# Patient Record
Sex: FEMALE | Race: BLACK OR AFRICAN AMERICAN
[De-identification: names, ages, dates, MRNs, and addresses within clinical notes are randomized per-mention and may not be internally consistent; named-entity substitution may affect disease eponyms.]

---

## 2017-06-27 ENCOUNTER — HOSPITAL ENCOUNTER (EMERGENCY)
Dept: HOSPITAL 84 - D.ER | Age: 22
Discharge: HOME | End: 2017-06-27
Payer: MEDICAID

## 2017-06-27 DIAGNOSIS — E87.6: ICD-10-CM

## 2017-06-27 DIAGNOSIS — A59.9: ICD-10-CM

## 2017-06-27 DIAGNOSIS — R10.11: Primary | ICD-10-CM

## 2017-06-27 LAB
ALBUMIN SERPL-MCNC: 3.6 G/DL (ref 3.4–5)
ALP SERPL-CCNC: 75 U/L (ref 46–116)
ALT SERPL-CCNC: 16 U/L (ref 10–68)
ANION GAP SERPL CALC-SCNC: 12.9 MMOL/L (ref 8–16)
APPEARANCE UR: (no result)
BACTERIA #/AREA URNS HPF: (no result) /HPF
BASOPHILS NFR BLD AUTO: 0.1 % (ref 0–2)
BILIRUB SERPL-MCNC: 0.54 MG/DL (ref 0.2–1.3)
BILIRUB SERPL-MCNC: NEGATIVE MG/DL
BUN SERPL-MCNC: 18 MG/DL (ref 7–18)
CALCIUM SERPL-MCNC: 9 MG/DL (ref 8.5–10.1)
CHLORIDE SERPL-SCNC: 101 MMOL/L (ref 98–107)
CO2 SERPL-SCNC: 24.3 MMOL/L (ref 21–32)
COLOR UR: (no result)
CREAT SERPL-MCNC: 0.9 MG/DL (ref 0.6–1.3)
EOSINOPHIL NFR BLD: 0.5 % (ref 0–7)
ERYTHROCYTE [DISTWIDTH] IN BLOOD BY AUTOMATED COUNT: 13 % (ref 11.5–14.5)
GLOBULIN SER-MCNC: 4.5 G/L
GLUCOSE SERPL-MCNC: 91 MG/DL (ref 74–106)
GLUCOSE SERPL-MCNC: NEGATIVE MG/DL
HCG SERPL-ACNC: NEGATIVE M[IU]/ML
HCT VFR BLD CALC: 39.1 % (ref 36–48)
HGB BLD-MCNC: 13 G/DL (ref 12–16)
IMM GRANULOCYTES NFR BLD: 0.1 % (ref 0–5)
KETONES UR STRIP-MCNC: (no result) MG/DL
LEUKOCYTE ESTERASE: (no result)
LYMPHOCYTES NFR BLD AUTO: 28.1 % (ref 15–50)
MCH RBC QN AUTO: 30.1 PG (ref 26–34)
MCHC RBC AUTO-ENTMCNC: 33.2 G/DL (ref 31–37)
MCV RBC: 90.5 FL (ref 80–100)
MONOCYTES NFR BLD: 13.9 % (ref 2–11)
NEUTROPHILS NFR BLD AUTO: 57.3 % (ref 40–80)
NITRITE UR-MCNC: NEGATIVE MG/ML
OSMOLALITY SERPL CALC.SUM OF ELEC: 271 MOSM/KG (ref 275–300)
PH UR STRIP: 5 [PH] (ref 5–6)
PLATELET # BLD: 297 10X3/UL (ref 130–400)
PMV BLD AUTO: 10.7 FL (ref 7.4–10.4)
POTASSIUM SERPL-SCNC: 3.2 MMOL/L (ref 3.5–5.1)
PROT SERPL-MCNC: 8.1 G/DL (ref 6.4–8.2)
PROT UR-MCNC: NEGATIVE MG/DL
RBC # BLD AUTO: 4.32 10X6/UL (ref 4–5.4)
SODIUM SERPL-SCNC: 135 MMOL/L (ref 136–145)
SP GR UR STRIP: 1.01 (ref 1–1.02)
SQUAMOUS #/AREA URNS HPF: (no result) /HPF (ref 0–5)
T VAGINALIS URNS QL MICRO: PRESENT /HPF
UROBILINOGEN UR-MCNC: NORMAL MG/DL
WBC # BLD AUTO: 8.1 10X3/UL (ref 4.8–10.8)
WBC #/AREA URNS HPF: (no result) /HPF (ref 0–5)

## 2018-06-17 ENCOUNTER — HOSPITAL ENCOUNTER (EMERGENCY)
Dept: HOSPITAL 84 - D.ER | Age: 23
Discharge: HOME | End: 2018-06-17
Payer: MEDICAID

## 2018-06-17 VITALS — SYSTOLIC BLOOD PRESSURE: 122 MMHG | DIASTOLIC BLOOD PRESSURE: 77 MMHG

## 2018-06-17 VITALS
BODY MASS INDEX: 28.77 KG/M2 | HEIGHT: 63 IN | HEIGHT: 63 IN | BODY MASS INDEX: 28.77 KG/M2 | WEIGHT: 162.34 LBS | WEIGHT: 162.34 LBS

## 2018-06-17 DIAGNOSIS — N39.0: Primary | ICD-10-CM

## 2018-06-17 DIAGNOSIS — R51: ICD-10-CM

## 2018-06-17 DIAGNOSIS — R11.2: ICD-10-CM

## 2018-06-17 LAB
ALBUMIN SERPL-MCNC: 4.1 G/DL (ref 3.4–5)
ALP SERPL-CCNC: 61 U/L (ref 46–116)
ALT SERPL-CCNC: 22 U/L (ref 10–68)
ANION GAP SERPL CALC-SCNC: 11.6 MMOL/L (ref 8–16)
APPEARANCE UR: (no result)
BACTERIA #/AREA URNS HPF: (no result) /HPF
BASOPHILS NFR BLD AUTO: 0.1 % (ref 0–2)
BILIRUB SERPL-MCNC: 0.34 MG/DL (ref 0.2–1.3)
BILIRUB SERPL-MCNC: NEGATIVE MG/DL
BUN SERPL-MCNC: 12 MG/DL (ref 7–18)
CALCIUM SERPL-MCNC: 9.6 MG/DL (ref 8.5–10.1)
CHLORIDE SERPL-SCNC: 104 MMOL/L (ref 98–107)
CO2 SERPL-SCNC: 29.6 MMOL/L (ref 21–32)
COLOR UR: (no result)
CREAT SERPL-MCNC: 1.1 MG/DL (ref 0.6–1.3)
EOSINOPHIL NFR BLD: 0.1 % (ref 0–7)
ERYTHROCYTE [DISTWIDTH] IN BLOOD BY AUTOMATED COUNT: 13.2 % (ref 11.5–14.5)
GLOBULIN SER-MCNC: 4.3 G/L
GLUCOSE SERPL-MCNC: 109 MG/DL (ref 74–106)
GLUCOSE SERPL-MCNC: NEGATIVE MG/DL
HCG SERPL-ACNC: NEGATIVE M[IU]/ML
HCG UR QL: NEGATIVE
HCT VFR BLD CALC: 43.3 % (ref 36–48)
HGB BLD-MCNC: 14.4 G/DL (ref 12–16)
IMM GRANULOCYTES NFR BLD: 0.3 % (ref 0–5)
KETONES UR STRIP-MCNC: (no result) MG/DL
LIPASE SERPL-CCNC: 182 U/L (ref 73–393)
LYMPHOCYTES NFR BLD AUTO: 28.4 % (ref 15–50)
MCH RBC QN AUTO: 30.3 PG (ref 26–34)
MCHC RBC AUTO-ENTMCNC: 33.3 G/DL (ref 31–37)
MCV RBC: 91.2 FL (ref 80–100)
MONOCYTES NFR BLD: 6.3 % (ref 2–11)
MUCOUS THREADS #/AREA URNS LPF: (no result) /LPF
NEUTROPHILS NFR BLD AUTO: 64.8 % (ref 40–80)
NITRITE UR-MCNC: NEGATIVE MG/ML
OSMOLALITY SERPL CALC.SUM OF ELEC: 283 MOSM/KG (ref 275–300)
PH UR STRIP: 8 [PH] (ref 5–6)
PLATELET # BLD: 309 10X3/UL (ref 130–400)
PMV BLD AUTO: 11 FL (ref 7.4–10.4)
POTASSIUM SERPL-SCNC: 3.2 MMOL/L (ref 3.5–5.1)
PROT SERPL-MCNC: 8.4 G/DL (ref 6.4–8.2)
PROT UR-MCNC: (no result) MG/DL
RBC # BLD AUTO: 4.75 10X6/UL (ref 4–5.4)
RBC #/AREA URNS HPF: >50 /HPF (ref 0–5)
SODIUM SERPL-SCNC: 142 MMOL/L (ref 136–145)
SP GR UR STRIP: 1 (ref 1–1.02)
UROBILINOGEN UR-MCNC: 1 MG/DL
WBC # BLD AUTO: 6.9 10X3/UL (ref 4.8–10.8)
WBC #/AREA URNS HPF: >50 /HPF (ref 0–5)

## 2018-06-19 ENCOUNTER — HOSPITAL ENCOUNTER (EMERGENCY)
Dept: HOSPITAL 84 - D.ER | Age: 23
Discharge: HOME | End: 2018-06-19
Payer: MEDICAID

## 2018-06-19 VITALS — SYSTOLIC BLOOD PRESSURE: 117 MMHG | DIASTOLIC BLOOD PRESSURE: 71 MMHG

## 2018-06-19 VITALS
HEIGHT: 63 IN | WEIGHT: 169 LBS | BODY MASS INDEX: 29.95 KG/M2 | HEIGHT: 63 IN | WEIGHT: 169 LBS | BODY MASS INDEX: 29.95 KG/M2

## 2018-06-19 DIAGNOSIS — N39.0: Primary | ICD-10-CM

## 2018-06-19 DIAGNOSIS — E87.6: ICD-10-CM

## 2018-06-19 DIAGNOSIS — R11.2: ICD-10-CM

## 2018-06-19 LAB
ALBUMIN SERPL-MCNC: 3.9 G/DL (ref 3.4–5)
ALP SERPL-CCNC: 57 U/L (ref 46–116)
ALT SERPL-CCNC: 25 U/L (ref 10–68)
ANION GAP SERPL CALC-SCNC: 14.1 MMOL/L (ref 8–16)
BASOPHILS NFR BLD AUTO: 0 % (ref 0–2)
BILIRUB SERPL-MCNC: 0.4 MG/DL (ref 0.2–1.3)
BUN SERPL-MCNC: 13 MG/DL (ref 7–18)
CALCIUM SERPL-MCNC: 9.2 MG/DL (ref 8.5–10.1)
CHLORIDE SERPL-SCNC: 103 MMOL/L (ref 98–107)
CO2 SERPL-SCNC: 24 MMOL/L (ref 21–32)
CREAT SERPL-MCNC: 1 MG/DL (ref 0.6–1.3)
CRP SERPL-MCNC: 0 MG/DL (ref 0–0.9)
EOSINOPHIL NFR BLD: 0 % (ref 0–7)
ERYTHROCYTE [DISTWIDTH] IN BLOOD BY AUTOMATED COUNT: 13.2 % (ref 11.5–14.5)
GLOBULIN SER-MCNC: 3.9 G/L
GLUCOSE SERPL-MCNC: 124 MG/DL (ref 74–106)
HCT VFR BLD CALC: 40.5 % (ref 36–48)
HGB BLD-MCNC: 13.4 G/DL (ref 12–16)
IMM GRANULOCYTES NFR BLD: 0.2 % (ref 0–5)
LYMPHOCYTES NFR BLD AUTO: 16 % (ref 15–50)
MCH RBC QN AUTO: 29.8 PG (ref 26–34)
MCHC RBC AUTO-ENTMCNC: 33.1 G/DL (ref 31–37)
MCV RBC: 90.2 FL (ref 80–100)
MONOCYTES NFR BLD: 3.6 % (ref 2–11)
NEUTROPHILS NFR BLD AUTO: 80.2 % (ref 40–80)
OSMOLALITY SERPL CALC.SUM OF ELEC: 276 MOSM/KG (ref 275–300)
PLATELET # BLD: 287 10X3/UL (ref 130–400)
PMV BLD AUTO: 11.2 FL (ref 7.4–10.4)
POTASSIUM SERPL-SCNC: 3.1 MMOL/L (ref 3.5–5.1)
PROT SERPL-MCNC: 7.8 G/DL (ref 6.4–8.2)
RBC # BLD AUTO: 4.49 10X6/UL (ref 4–5.4)
SODIUM SERPL-SCNC: 138 MMOL/L (ref 136–145)
WBC # BLD AUTO: 5.9 10X3/UL (ref 4.8–10.8)

## 2018-11-20 ENCOUNTER — HOSPITAL ENCOUNTER (EMERGENCY)
Dept: HOSPITAL 84 - D.ER | Age: 23
Discharge: HOME | End: 2018-11-20
Payer: MEDICAID

## 2018-11-20 ENCOUNTER — HOSPITAL ENCOUNTER (OUTPATIENT)
Dept: HOSPITAL 84 - D.LDO | Age: 23
Discharge: HOME | End: 2018-11-20
Attending: OBSTETRICS & GYNECOLOGY
Payer: MEDICAID

## 2018-11-20 VITALS — BODY MASS INDEX: 28.7 KG/M2

## 2018-11-20 DIAGNOSIS — O26.892: Primary | ICD-10-CM

## 2018-11-20 DIAGNOSIS — Z3A.27: ICD-10-CM

## 2018-11-20 DIAGNOSIS — R51: ICD-10-CM

## 2018-11-20 DIAGNOSIS — R11.10: ICD-10-CM

## 2018-11-20 DIAGNOSIS — R10.9: Primary | ICD-10-CM

## 2018-11-20 LAB
APPEARANCE UR: (no result)
BACTERIA #/AREA URNS HPF: (no result) /HPF
BILIRUB SERPL-MCNC: NEGATIVE MG/DL
COLOR UR: YELLOW
GLUCOSE SERPL-MCNC: NEGATIVE MG/DL
KETONES UR STRIP-MCNC: (no result) MG/DL
NITRITE UR-MCNC: NEGATIVE MG/ML
PH UR STRIP: 7 [PH] (ref 5–6)
PROT UR-MCNC: (no result) MG/DL
RBC #/AREA URNS HPF: (no result) /HPF (ref 0–5)
SP GR UR STRIP: 1.01 (ref 1–1.02)
SQUAMOUS #/AREA URNS HPF: (no result) /HPF (ref 0–5)
UROBILINOGEN UR-MCNC: NORMAL MG/DL
WBC #/AREA URNS HPF: (no result) /HPF (ref 0–5)

## 2018-11-21 ENCOUNTER — HOSPITAL ENCOUNTER (OUTPATIENT)
Dept: HOSPITAL 84 - D.LDO | Age: 23
Discharge: HOME | End: 2018-11-21
Attending: OBSTETRICS & GYNECOLOGY
Payer: MEDICAID

## 2018-11-21 VITALS — BODY MASS INDEX: 28.7 KG/M2

## 2018-11-21 DIAGNOSIS — Z3A.23: ICD-10-CM

## 2018-11-21 DIAGNOSIS — O26.892: Primary | ICD-10-CM

## 2018-11-21 LAB
ALBUMIN SERPL-MCNC: 3.3 G/DL (ref 3.4–5)
ALP SERPL-CCNC: 57 U/L (ref 46–116)
ALT SERPL-CCNC: 35 U/L (ref 10–68)
ANION GAP SERPL CALC-SCNC: 15.1 MMOL/L (ref 8–16)
APPEARANCE UR: (no result)
BACTERIA #/AREA URNS HPF: (no result) /HPF
BASOPHILS NFR BLD AUTO: 0 % (ref 0–2)
BILIRUB SERPL-MCNC: 0.3 MG/DL (ref 0.2–1.3)
BILIRUB SERPL-MCNC: NEGATIVE MG/DL
BUN SERPL-MCNC: 10 MG/DL (ref 7–18)
CALCIUM SERPL-MCNC: 9.1 MG/DL (ref 8.5–10.1)
CHLORIDE SERPL-SCNC: 100 MMOL/L (ref 98–107)
CO2 SERPL-SCNC: 23.3 MMOL/L (ref 21–32)
COLOR UR: YELLOW
CREAT SERPL-MCNC: 0.8 MG/DL (ref 0.6–1.3)
EOSINOPHIL NFR BLD: 0 % (ref 0–7)
ERYTHROCYTE [DISTWIDTH] IN BLOOD BY AUTOMATED COUNT: 12.8 % (ref 11.5–14.5)
GLOBULIN SER-MCNC: 4.5 G/L
GLUCOSE SERPL-MCNC: 1000 MG/DL
GLUCOSE SERPL-MCNC: 116 MG/DL (ref 74–106)
HCT VFR BLD CALC: 34.2 % (ref 36–48)
HGB BLD-MCNC: 11.7 G/DL (ref 12–16)
IMM GRANULOCYTES NFR BLD: 0.4 % (ref 0–5)
KETONES UR STRIP-MCNC: (no result) MG/DL
LYMPHOCYTES NFR BLD AUTO: 10.7 % (ref 15–50)
MCH RBC QN AUTO: 31 PG (ref 26–34)
MCHC RBC AUTO-ENTMCNC: 34.2 G/DL (ref 31–37)
MCV RBC: 90.5 FL (ref 80–100)
MONOCYTES NFR BLD: 7.4 % (ref 2–11)
MUCOUS THREADS #/AREA URNS LPF: (no result) /LPF
NEUTROPHILS NFR BLD AUTO: 81.5 % (ref 40–80)
NITRITE UR-MCNC: NEGATIVE MG/ML
OSMOLALITY SERPL CALC.SUM OF ELEC: 269 MOSM/KG (ref 275–300)
PH UR STRIP: 5 [PH] (ref 5–6)
PLATELET # BLD: 244 10X3/UL (ref 130–400)
PMV BLD AUTO: 11.8 FL (ref 7.4–10.4)
POTASSIUM SERPL-SCNC: 3.4 MMOL/L (ref 3.5–5.1)
PROT SERPL-MCNC: 7.8 G/DL (ref 6.4–8.2)
PROT UR-MCNC: NEGATIVE MG/DL
RBC # BLD AUTO: 3.78 10X6/UL (ref 4–5.4)
RBC #/AREA URNS HPF: (no result) /HPF (ref 0–5)
SODIUM SERPL-SCNC: 135 MMOL/L (ref 136–145)
SP GR UR STRIP: 1.02 (ref 1–1.02)
SQUAMOUS #/AREA URNS HPF: (no result) /HPF (ref 0–5)
UROBILINOGEN UR-MCNC: NORMAL MG/DL
WBC # BLD AUTO: 10.2 10X3/UL (ref 4.8–10.8)

## 2019-01-28 ENCOUNTER — HOSPITAL ENCOUNTER (OUTPATIENT)
Dept: HOSPITAL 84 - D.LDO | Age: 24
Discharge: HOME | End: 2019-01-28
Attending: OBSTETRICS & GYNECOLOGY
Payer: MEDICAID

## 2019-01-28 VITALS — BODY MASS INDEX: 28.7 KG/M2

## 2019-01-28 DIAGNOSIS — M54.5: ICD-10-CM

## 2019-01-28 DIAGNOSIS — O26.893: Primary | ICD-10-CM

## 2019-01-28 DIAGNOSIS — Z3A.32: ICD-10-CM

## 2019-01-28 DIAGNOSIS — R10.2: ICD-10-CM

## 2019-01-28 LAB
AMPHETAMINES UR QL SCN: NEGATIVE QUAL
APPEARANCE UR: CLEAR
BACTERIA #/AREA URNS HPF: (no result) /HPF
BARBITURATES UR QL SCN: NEGATIVE QUAL
BENZODIAZ UR QL SCN: NEGATIVE QUAL
BILIRUB SERPL-MCNC: NEGATIVE MG/DL
BZE UR QL SCN: NEGATIVE QUAL
CANNABINOIDS UR QL SCN: POSITIVE QUAL
COLOR UR: YELLOW
GLUCOSE SERPL-MCNC: NEGATIVE MG/DL
KETONES UR STRIP-MCNC: NEGATIVE MG/DL
MUCOUS THREADS #/AREA URNS LPF: (no result) /LPF
NITRITE UR-MCNC: NEGATIVE MG/ML
OPIATES UR QL SCN: NEGATIVE QUAL
PCP UR QL SCN: NEGATIVE QUAL
PH UR STRIP: 7 [PH] (ref 5–6)
PROT UR-MCNC: NEGATIVE MG/DL
SP GR UR STRIP: 1.01 (ref 1–1.02)
SQUAMOUS #/AREA URNS HPF: (no result) /HPF (ref 0–5)
UROBILINOGEN UR-MCNC: NORMAL MG/DL
WBC #/AREA URNS HPF: (no result) /HPF (ref 0–5)

## 2019-02-06 ENCOUNTER — HOSPITAL ENCOUNTER (OUTPATIENT)
Dept: HOSPITAL 84 - D.LDO | Age: 24
End: 2019-02-06
Attending: OBSTETRICS & GYNECOLOGY
Payer: MEDICAID

## 2019-02-06 VITALS — BODY MASS INDEX: 28.7 KG/M2

## 2019-02-06 DIAGNOSIS — O26.899: Primary | ICD-10-CM

## 2019-02-06 DIAGNOSIS — Z3A.00: ICD-10-CM

## 2019-02-06 LAB
AMPHETAMINES UR QL SCN: NEGATIVE QUAL
APPEARANCE UR: CLEAR
BARBITURATES UR QL SCN: NEGATIVE QUAL
BENZODIAZ UR QL SCN: NEGATIVE QUAL
BILIRUB SERPL-MCNC: NEGATIVE MG/DL
BZE UR QL SCN: NEGATIVE QUAL
CANNABINOIDS UR QL SCN: NEGATIVE QUAL
COLOR UR: YELLOW
GLUCOSE SERPL-MCNC: NEGATIVE MG/DL
KETONES UR STRIP-MCNC: NEGATIVE MG/DL
NITRITE UR-MCNC: NEGATIVE MG/ML
OPIATES UR QL SCN: NEGATIVE QUAL
PCP UR QL SCN: NEGATIVE QUAL
PH UR STRIP: 8 [PH] (ref 5–6)
PROT UR-MCNC: NEGATIVE MG/DL
SP GR UR STRIP: 1.01 (ref 1–1.02)
UROBILINOGEN UR-MCNC: NORMAL MG/DL

## 2019-03-06 ENCOUNTER — HOSPITAL ENCOUNTER (OUTPATIENT)
Dept: HOSPITAL 84 - D.LDO | Age: 24
Discharge: HOME | End: 2019-03-06
Attending: OBSTETRICS & GYNECOLOGY
Payer: MEDICAID

## 2019-03-06 VITALS — BODY MASS INDEX: 28.7 KG/M2

## 2019-03-06 DIAGNOSIS — O47.9: Primary | ICD-10-CM

## 2019-03-06 DIAGNOSIS — Z3A.00: ICD-10-CM

## 2019-03-06 LAB
APPEARANCE UR: CLEAR
BACTERIA #/AREA URNS HPF: (no result) /HPF
BILIRUB SERPL-MCNC: NEGATIVE MG/DL
COLOR UR: YELLOW
GLUCOSE SERPL-MCNC: NEGATIVE MG/DL
HYALINE CASTS #/AREA URNS LPF: (no result) /LPF
KETONES UR STRIP-MCNC: NEGATIVE MG/DL
MUCOUS THREADS #/AREA URNS LPF: (no result) /LPF
NITRITE UR-MCNC: NEGATIVE MG/ML
PH UR STRIP: 6 [PH] (ref 5–6)
PROT UR-MCNC: NEGATIVE MG/DL
SP GR UR STRIP: 1.01 (ref 1–1.02)
SQUAMOUS #/AREA URNS HPF: (no result) /HPF (ref 0–5)
UROBILINOGEN UR-MCNC: NORMAL MG/DL
WBC #/AREA URNS HPF: (no result) /HPF (ref 0–5)

## 2019-03-14 ENCOUNTER — HOSPITAL ENCOUNTER (INPATIENT)
Dept: HOSPITAL 84 - D.LD | Age: 24
LOS: 2 days | Discharge: HOME | End: 2019-03-16
Attending: OBSTETRICS & GYNECOLOGY | Admitting: OBSTETRICS & GYNECOLOGY
Payer: MEDICAID

## 2019-03-14 VITALS — HEIGHT: 63 IN | WEIGHT: 141.3 LBS | BODY MASS INDEX: 25.04 KG/M2

## 2019-03-14 VITALS — DIASTOLIC BLOOD PRESSURE: 72 MMHG | SYSTOLIC BLOOD PRESSURE: 115 MMHG

## 2019-03-14 VITALS — DIASTOLIC BLOOD PRESSURE: 64 MMHG | SYSTOLIC BLOOD PRESSURE: 109 MMHG

## 2019-03-14 DIAGNOSIS — B95.1: ICD-10-CM

## 2019-03-14 LAB
ERYTHROCYTE [DISTWIDTH] IN BLOOD BY AUTOMATED COUNT: 12.8 % (ref 11.5–14.5)
HCT VFR BLD CALC: 32.1 % (ref 36–48)
HGB BLD-MCNC: 10.7 G/DL (ref 12–16)
MCH RBC QN AUTO: 29.3 PG (ref 26–34)
MCHC RBC AUTO-ENTMCNC: 33.3 G/DL (ref 31–37)
MCV RBC: 87.9 FL (ref 80–100)
PLATELET # BLD: 183 10X3/UL (ref 130–400)
PMV BLD AUTO: 11.4 FL (ref 7.4–10.4)
RBC # BLD AUTO: 3.65 10X6/UL (ref 4–5.4)
WBC # BLD AUTO: 4.8 10X3/UL (ref 4.8–10.8)

## 2019-03-14 PROCEDURE — 3E033VJ INTRODUCTION OF OTHER HORMONE INTO PERIPHERAL VEIN, PERCUTANEOUS APPROACH: ICD-10-PCS | Performed by: OBSTETRICS & GYNECOLOGY

## 2019-03-14 NOTE — NUR
RECEIVED CORD PH FROM LABOR AND DELIVERY, TRIED TO RUN BUT NOT ENOUGH SAMPLE
AVAILABLE. FAILED. CALLED AND SPOKE WITH ALONDRA AND TOLD HER THE SAMPLE FAILED,
SHE REPLIED OK, THANK YOU.

## 2019-03-14 NOTE — NUR
THIS RN TOOK OVER CARE, REPORT FROM JERSON LOBATO RN, PT'S BED CHANGED OUT
FROM A LABOR BED TO A REGULAR BED, ASSESSMENT PER FLOW SHEET, VS WERE OBTAINED
PER JERSON LOBATO RN, FF, ML, U/U, LITE BLEEDING NOTED, PT REPORTS FLATUS,
NO BM AND VOIDING WITH NO DIFFICULTY, PT DENIES PAIN OR NEEDS AT THIS TIME,
COUCH MADE INTO BED FOR FOB AND OTHER CHILD

## 2019-03-14 NOTE — NUR
ROUNDS MADE. PT IN HIGH FOWLERS HOLDING INFANT AND CONVERSING WITH FAMILY
MEMBERS. DENIES NEEDS AT THIS TIME. BED IN LOW POSITION WITH UPPER SIDE RAILS
RAISED X2. CALL LIGHT AND PHONE WITHIN REACH. UPDATED ON PLANS TO TRANSFER TO
DIFFERENT ROOM, VERBALIZED UNDERSTANDING AND AGREEMENT.

## 2019-03-14 NOTE — NUR
PIV SL. VSS. FUNDUS REMAINS FIRM MIDLINE AND U2 WITH SMALL AMT RUBRA LOCHIA,
NO CLOTS. BEDSIDE REPORT GIVEN TO EVERETTE MOLINA RN TO RESUME CARE OF PT.

## 2019-03-14 NOTE — NUR
CONTINUES TO CONVERSE WITH FAMILY MEMBERS. GOWN PROVIDED PER PT REQUEST, ICE
WATER PROVIDED. FOB HOLDING INFANT. PT DENIES NEEDS AT THIS TIME. FUNDUS FIRM,
SMALL AMT RUBRA LOCHIA NOTED, NO CLOTS PRESENT. BED IN LOW POSITION WITH UPPER
SIDE RAILS RAISED X2. CALL LIGHT AND PHONE WITHIN REACH.

## 2019-03-15 VITALS — DIASTOLIC BLOOD PRESSURE: 61 MMHG | SYSTOLIC BLOOD PRESSURE: 101 MMHG

## 2019-03-15 LAB
BASOPHILS NFR BLD AUTO: 0.1 % (ref 0–2)
EOSINOPHIL NFR BLD: 0.6 % (ref 0–7)
ERYTHROCYTE [DISTWIDTH] IN BLOOD BY AUTOMATED COUNT: 12.9 % (ref 11.5–14.5)
HCT VFR BLD CALC: 32.2 % (ref 36–48)
HGB BLD-MCNC: 10.6 G/DL (ref 12–16)
IMM GRANULOCYTES NFR BLD: 0.2 % (ref 0–5)
LYMPHOCYTES NFR BLD AUTO: 24.2 % (ref 15–50)
MCH RBC QN AUTO: 29.1 PG (ref 26–34)
MCHC RBC AUTO-ENTMCNC: 32.9 G/DL (ref 31–37)
MCV RBC: 88.5 FL (ref 80–100)
MONOCYTES NFR BLD: 9.2 % (ref 2–11)
NEUTROPHILS NFR BLD AUTO: 65.7 % (ref 40–80)
PLATELET # BLD: 172 10X3/UL (ref 130–400)
PMV BLD AUTO: 11.8 FL (ref 7.4–10.4)
RBC # BLD AUTO: 3.64 10X6/UL (ref 4–5.4)
WBC # BLD AUTO: 8.8 10X3/UL (ref 4.8–10.8)

## 2019-03-15 NOTE — NUR
MOTRIN PROVIDED PER REQUEST. INFANT COMPLETED BF. SHIFT ASSESSMENT COMPLETED.
VSS. FUNDUS FIRM MIDLINE AND U1 WITH SMALL AMT RUBRA LOCHIA, NO CLOTS PRESENT.
NO PERINEAL/LABIAL SWELLING NOTED. REPORTS THAT SHE IS VOIDING WITHOUT
DIFFICULTY. BREATH SOUNDS CLEAR AND EQUAL BILATERALLY. RESPIRATIONS REGULAR
AND UNLABORED. BOWEL SOUNDS PRESENT AND ACTIVE IN ALL 4 QUADRANTS. PT REPORTS
THAT SHE IS PASSING FLATUS. REPORTS THAT SHE IS USING PERIBOTTLE WITH VOIDS.
SANDWICH TRAY, ICE WATER, AND PUDDING PROVIDED PER REQUEST. DENIES ADDITIONAL
NEEDS AT THIS TIME. BED IN LOW POSITION WITH UPPER SIDE RAILS RAISED X2. CALL
LIGHT AND PHONE WITHIN REACH.

## 2019-03-15 NOTE — NUR
ROUNDS MADE. PT CONVERSING WITH SIGNIFICANT OTHER. SODA PROVIDED PER REQUEST.
DENIES ADDITIONAL NEEDS. BED IN LOW POSITION WITH UPPER SIDE RAILS RAISED X2.
CALL LIGHT AND PHONE WITHIN REACH. WILL CONTINUE TO MONITOR AND ASSIST PRN.

## 2019-03-15 NOTE — NUR
PT AWAKE, RATES ROBBI PAIN AND CRAMPING 4/10, DENIES NEEDS AT THIS TIME, FOB
AND OTHER CHILD ASLEEP ON COUCH

## 2019-03-15 NOTE — NUR
AM ASSESSMENT COMPLETED AS SEEN ON FLOWSHEET.  DENIES PAIN OR DISCOMFORT AT
THIS TIME.  QUESTION ANSWERED ON WHEN SALINE LOCK COULD BE REMOVED.
ADDITIONAL BREAKFAST TRAY ORDERED PER REQUEST OF PATIENT.  FUNDUS FIRM AT U/U
WITH LIGHT BLEEDING NO CLOTS NOTED WITH MASSAGE,  ROBBI PADS PLACED IN
BATHROOM. SIDE RAILS UP X 2 WITH CALL LIGHT IN REACH.

## 2019-03-15 NOTE — NUR
PT RESTING WITH EYES CLOSED, RESP QUIET, NO DISTRESS NOTED, LEFT UNDISTURBED
AT THIS TIME, FOB AND OTHER CHILD ASLEEP ON COUCH

## 2019-03-15 NOTE — NUR
REC'D PT SITTING IN HIGH FOWLERS POSITION BREASTFEEDING INFANT AND CONVERSING
WITH FAMILY MEMBERS. C/O CRAMPING DURING BF 4/10, DISCUSSED INTERVERNTIONS,
REQUESTS MOTRIN. DENIES ADDITIONAL NEEDS AT THIS TIME. SIGNIFICANT OTHER AT
BEDSIDE, SUPPORTIVE AND ATTENTIVE TO PT. BED IN LOW POSITION WITH UPPER SIDE
RAILS RAISED X2. CALL LIGHT AND PHONE WITHIN REACH. WILL CONTINUE TO MONITOR
AND ASSIST PRN.

## 2019-03-15 NOTE — NUR
Jaret Rodas
[birth year]
 
S: Patient states breastfeeding is going good. This is her first baby she has
breastfeed. Denies questions or concerns, problems with sore nipples, or any
breastfeeding questions. Patient states for infant 7:00 feeding she .
Feels like things are going good. Verbally agrees to ask for help as needed.
 
 
O: Patient sitting up in bed watching television, infant sleep in crib next to
bed, family member in room. Congratulated on delivery and asked how are things
going with breastfeeding. Explained breastfeeding benefits for mother and
infant, positions, how to verify infant is latched correctly to the breast,
breastmilk composition, normal feeding patterns, and supply and demand.
Breastfeeding is a learn experience for both mother and infant. Breastfeeding
does take time, practice, and patience in the beginning. Asked if any concerns
or questions about breastfeeding? Any problems with sore nipples, latching
infant?
 
A: Patient appears confident with breastfeeding due to no concerns expressed.
 
P: Continue to support and promote breastfeeding during hospital visit.
 
Eloy Stack, CLC

## 2019-03-15 NOTE — NUR
TRANSFERRED TO ROOM 1257, ORIENT TO ROOM AND HOW TO ACCESS NURSERY.  LARGE
GLASS OF WATER PER REQUEST. INFANT IN CRIB AT BEDSIDE WITH FAMILY MEMBERS
PRESENT.  RATES PAIN AT 0/10.

## 2019-03-15 NOTE — NUR
PAIN REASSESSMENT COMPLETE. DENIES PAIN AND NEEDS. BED IN LOW POSITION WITH
UPPER SIDE RAILS RAISED X2. CALL LIGHT AND PHONE WITHIN REACH. WILL CONTINUE
TO MONITOR AND ASSIST PRN.

## 2019-03-15 NOTE — NUR
PT ON CALL LIGHT, C/O SORENESS AND CRAMPING, ADM MOTRIN AND NORCO PER MD
ORDERS, SEE EMAR, WITH FRESH H20, PT DENIES FURTHER NEEDS, FOB AWAKE, AND
OTHER CHILD ASLEEP ON COUCH

## 2019-03-15 NOTE — NUR
denies pain or discomfort.  austen pads placed in bathroom per request.  infant
in room and family present.

## 2019-03-15 NOTE — NUR
PT TALKING TO FOB, FOB HOLDING BABY, PT DENIES NEEDS OR PAIN AT THIS TIME,
OTHER CHILD ASLEEP ON COUCH

## 2019-03-15 NOTE — NUR
ROUNDS MADE. DENIES PAIN. C/O ITCHING TO RIGHT HAND PIV AND REQUEST THAT IT BE
REMOVED. LABS REVIEWED. PIV REMOVED PER PT REQUEST. VERBALIZES UNDERSTANDING
THAT IF IV ACCESS IS NEEDED A NEW ONE CAN BE PLACED. CURRENTLY BF INFANT.
DENIES NEEDS. BED IN LOW POSITION WITH UPPER SIDE RAILS RAISED X2. CALL LIGHT
AND PHONE WITHIN REACH. WILL CONTINUE TO MONITOR AND ASSIST PRN.

## 2019-03-16 VITALS — DIASTOLIC BLOOD PRESSURE: 64 MMHG | SYSTOLIC BLOOD PRESSURE: 103 MMHG

## 2019-03-16 NOTE — NUR
DISCHARGE INSTRUCTIONS EXPLAINED TO PT, ALONG WITH COPIES GIVEN OF D/C
INSTRUCTIONS, HEALTH SUMMARY, PP INSTRUCTION SHEET, HOME MED LIST.  PT DENIES
QUESTIONS.  SEE EMAR FOR ALL MEDS ADM BY THIS RN.  PT DISCHARGED IN STABLE
CONDITION BY WHEELCHAIR, WITH INFANT IN CARSEAT, TO PRIVATE VEHICLE, WITH SIG
OTHER DRIVING, AND OTHER YOUNG SON, AS WELL.

## 2019-03-16 NOTE — NUR
PT RESTING QUIETLY ON RIGHT SIDE WITH EYES CLOSED. RESPIRATIONS REGULAR AND
UNLABORED, NO S/S OF DISTRESS NOTED. BED IN LOW POSITION WITH UPPER SIDE RAILS
RAISED X2. CALL LIGHT AND PHONE WITHIN REACH.

## 2019-03-16 NOTE — NUR
PT RESTING QUIETLY IN RIGHT SIDE. RESPIRATIONS REGULAR AND UNLABORED, NO S/S
OF DISTRESS NOTED. PT INFORMED THAT INFANT WAS BEING TAKEN TO NBN FOR V/S
CHECK AND WEIGHT, VERBALIZES AGREEMENT, REQUEST INFANT BE BROUGHT BACK TO ROOM
FOLLOWING V/S AND WEIGHT CHECK. EVERETTE MILLER LPN NOTIFIED.

## 2019-03-16 NOTE — NUR
ROUNDS MADE. BF INFANT. DENIES PAIN AND NEEDS AT THIS TIME. SIGNIFICANT OTHER
AT BEDSIDE, SUPPORTIVE AND ATTENTIVE TO PT AND INFANT NEEDS. BED IN LOW
POSITION WITH UPPER SIDE RAILS RAISED X2. CALL LIGHT AND PHONE WITHIN REACH.
WILL CONTINUE TO MONITOR AND ASSIST PRN.

## 2019-03-16 NOTE — NUR
PT IN SEMI-FOWLERS POSITION ON CELL PHONE. DENIES PAIN AND NEEDS AT THIS TIME.
INFANT REMAINS IN ROOM IN OPEN CRIB. BED IN LOW POSITION WITH UPPER SIDE
RAILS RAISED X2. CALL LIGHT AND PHONE WITHIN REACH. WILL CONTINUE TO MONITOR
AND ASSIST PRN.

## 2019-03-16 NOTE — NUR
TO PT'S ROOM FOR AM ASSESSMENT.  PT IS LYING IN BED, HOLDING INFANT AT THIS
TIME.  AM ASSESSMENT COMPLETED.  SEE FLOWSHEET.  PT DENIES PASSING CLOTS OR
HEAVY BLEEDING TODAY.  PT REPORTS THAT SHE WOULD TO  HAVE THE FLU SHOT AND
TDAP TODAY BEFORE GOING HOME.  DENIES PAIN OR NEED FOR PAIN MEDICATION.  LARGE
MUGOF ICE WATER TO PT.  DENIES ALL OTHER NEEDS AT THIS TIME. SRUP X 2, CALL
LIGHT AND PHONE WITHIN REACH.

## 2019-10-10 ENCOUNTER — HOSPITAL ENCOUNTER (OUTPATIENT)
Dept: HOSPITAL 84 - D.ER | Age: 24
Setting detail: OBSERVATION
LOS: 1 days | Discharge: HOME | End: 2019-10-11
Attending: OBSTETRICS & GYNECOLOGY | Admitting: OBSTETRICS & GYNECOLOGY
Payer: COMMERCIAL

## 2019-10-10 VITALS — HEIGHT: 63 IN | BODY MASS INDEX: 23.92 KG/M2 | BODY MASS INDEX: 23.92 KG/M2 | WEIGHT: 135 LBS

## 2019-10-10 VITALS — SYSTOLIC BLOOD PRESSURE: 109 MMHG | DIASTOLIC BLOOD PRESSURE: 62 MMHG

## 2019-10-10 VITALS — DIASTOLIC BLOOD PRESSURE: 62 MMHG | SYSTOLIC BLOOD PRESSURE: 109 MMHG

## 2019-10-10 VITALS — SYSTOLIC BLOOD PRESSURE: 108 MMHG | DIASTOLIC BLOOD PRESSURE: 60 MMHG

## 2019-10-10 DIAGNOSIS — Z3A.09: ICD-10-CM

## 2019-10-10 DIAGNOSIS — O21.9: Primary | ICD-10-CM

## 2019-10-10 LAB
ALBUMIN SERPL-MCNC: 3.7 G/DL (ref 3.4–5)
ALP SERPL-CCNC: 54 U/L (ref 46–116)
ALT SERPL-CCNC: 15 U/L (ref 10–68)
AMYLASE SERPL-CCNC: 150 U/L (ref 25–115)
ANION GAP SERPL CALC-SCNC: 16.6 MMOL/L (ref 8–16)
APPEARANCE UR: CLEAR
BACTERIA #/AREA URNS HPF: (no result) /HPF
BASOPHILS NFR BLD AUTO: 0.1 % (ref 0–2)
BILIRUB SERPL-MCNC: 0.35 MG/DL (ref 0.2–1.3)
BILIRUB SERPL-MCNC: NEGATIVE MG/DL
BUN SERPL-MCNC: 9 MG/DL (ref 7–18)
CALCIUM SERPL-MCNC: 8.8 MG/DL (ref 8.5–10.1)
CHLORIDE SERPL-SCNC: 102 MMOL/L (ref 98–107)
CO2 SERPL-SCNC: 23 MMOL/L (ref 21–32)
COLOR UR: YELLOW
CREAT SERPL-MCNC: 0.8 MG/DL (ref 0.6–1.3)
EOSINOPHIL NFR BLD: 0 % (ref 0–7)
ERYTHROCYTE [DISTWIDTH] IN BLOOD BY AUTOMATED COUNT: 12.9 % (ref 11.5–14.5)
GLOBULIN SER-MCNC: 4.4 G/L
GLUCOSE SERPL-MCNC: 114 MG/DL (ref 74–106)
GLUCOSE SERPL-MCNC: NEGATIVE MG/DL
HCT VFR BLD CALC: 40 % (ref 36–48)
HGB BLD-MCNC: 13.7 G/DL (ref 12–16)
IMM GRANULOCYTES NFR BLD: 0.3 % (ref 0–5)
KETONES UR STRIP-MCNC: (no result) MG/DL
LIPASE SERPL-CCNC: 355 U/L (ref 73–393)
LYMPHOCYTES NFR BLD AUTO: 14 % (ref 15–50)
MCH RBC QN AUTO: 30.9 PG (ref 26–34)
MCHC RBC AUTO-ENTMCNC: 34.3 G/DL (ref 31–37)
MCV RBC: 90.3 FL (ref 80–100)
MONOCYTES NFR BLD: 4.6 % (ref 2–11)
MUCOUS THREADS #/AREA URNS LPF: (no result) /LPF
NEUTROPHILS NFR BLD AUTO: 81 % (ref 40–80)
NITRITE UR-MCNC: NEGATIVE MG/ML
OSMOLALITY SERPL CALC.SUM OF ELEC: 275 MOSM/KG (ref 275–300)
PH UR STRIP: 5 [PH] (ref 5–6)
PLATELET # BLD: 324 10X3/UL (ref 130–400)
PMV BLD AUTO: 10.4 FL (ref 7.4–10.4)
POTASSIUM SERPL-SCNC: 3.6 MMOL/L (ref 3.5–5.1)
PROT SERPL-MCNC: 8.1 G/DL (ref 6.4–8.2)
PROT UR-MCNC: (no result) MG/DL
RBC # BLD AUTO: 4.43 10X6/UL (ref 4–5.4)
RBC #/AREA URNS HPF: (no result) /HPF (ref 0–5)
SODIUM SERPL-SCNC: 138 MMOL/L (ref 136–145)
SP GR UR STRIP: 1.02 (ref 1–1.02)
SQUAMOUS #/AREA URNS HPF: (no result) /HPF (ref 0–5)
UROBILINOGEN UR-MCNC: NORMAL MG/DL
WBC # BLD AUTO: 7.9 10X3/UL (ref 4.8–10.8)
WBC #/AREA URNS HPF: (no result) /HPF

## 2019-10-11 VITALS — DIASTOLIC BLOOD PRESSURE: 68 MMHG | SYSTOLIC BLOOD PRESSURE: 109 MMHG

## 2019-10-11 VITALS — DIASTOLIC BLOOD PRESSURE: 55 MMHG | SYSTOLIC BLOOD PRESSURE: 133 MMHG

## 2019-10-11 VITALS — SYSTOLIC BLOOD PRESSURE: 99 MMHG | DIASTOLIC BLOOD PRESSURE: 54 MMHG

## 2019-10-11 NOTE — NUR
ALERT AND ORIENTED X4. RESP. EVEN AND UNLABORED.HRRR. ABDOMEN SOFT WITH BS
NOTED. STATES FEELS BETTER TODAY. RESP.CTA. HRRR IV TO RT. HAND INTACT. MD
HERE WITH ANTICIPATED DISCHARGE. ENCOURAGED TO USE CALL LIGHT FOR ASSSIT.

## 2019-10-11 NOTE — NUR
IV DISCONTINUED AND VERBALIZED UNDERSTANDING OF DISCHARGE INSTRUCTIONS. STABLE
AT TIME OF DEPARTURE.

## 2019-10-19 ENCOUNTER — HOSPITAL ENCOUNTER (EMERGENCY)
Dept: HOSPITAL 84 - D.ER | Age: 24
Discharge: HOME | End: 2019-10-19
Payer: COMMERCIAL

## 2019-10-19 VITALS
BODY MASS INDEX: 23.97 KG/M2 | WEIGHT: 135.28 LBS | WEIGHT: 135.28 LBS | HEIGHT: 63 IN | BODY MASS INDEX: 23.97 KG/M2 | HEIGHT: 63 IN

## 2019-10-19 VITALS — SYSTOLIC BLOOD PRESSURE: 126 MMHG | DIASTOLIC BLOOD PRESSURE: 79 MMHG

## 2019-10-19 DIAGNOSIS — R06.02: ICD-10-CM

## 2019-10-19 DIAGNOSIS — O26.892: Primary | ICD-10-CM

## 2019-10-19 LAB
ALBUMIN SERPL-MCNC: 3.8 G/DL (ref 3.4–5)
ALP SERPL-CCNC: 67 U/L (ref 46–116)
ALT SERPL-CCNC: 54 U/L (ref 10–68)
ANION GAP SERPL CALC-SCNC: 15.6 MMOL/L (ref 8–16)
APPEARANCE UR: (no result)
BACTERIA #/AREA URNS HPF: (no result) /HPF
BASOPHILS NFR BLD AUTO: 0.1 % (ref 0–2)
BILIRUB SERPL-MCNC: 0.81 MG/DL (ref 0.2–1.3)
BILIRUB SERPL-MCNC: NEGATIVE MG/DL
BUN SERPL-MCNC: 17 MG/DL (ref 7–18)
CALCIUM SERPL-MCNC: 9.5 MG/DL (ref 8.5–10.1)
CHLORIDE SERPL-SCNC: 96 MMOL/L (ref 98–107)
CO2 SERPL-SCNC: 23.1 MMOL/L (ref 21–32)
COLOR UR: (no result)
CREAT SERPL-MCNC: 1 MG/DL (ref 0.6–1.3)
EOSINOPHIL NFR BLD: 0.3 % (ref 0–7)
ERYTHROCYTE [DISTWIDTH] IN BLOOD BY AUTOMATED COUNT: 12.5 % (ref 11.5–14.5)
GLOBULIN SER-MCNC: 4.8 G/L
GLUCOSE SERPL-MCNC: 100 MG/DL (ref 74–106)
GLUCOSE SERPL-MCNC: NEGATIVE MG/DL
HCG SERPL-ACNC: POSITIVE M[IU]/ML
HCT VFR BLD CALC: 44.1 % (ref 36–48)
HGB BLD-MCNC: 16.3 G/DL (ref 12–16)
IMM GRANULOCYTES NFR BLD: 0.3 % (ref 0–5)
KETONES UR STRIP-MCNC: (no result) MG/DL
LYMPHOCYTES NFR BLD AUTO: 32.4 % (ref 15–50)
MCH RBC QN AUTO: 31.4 PG (ref 26–34)
MCHC RBC AUTO-ENTMCNC: 37 G/DL (ref 31–37)
MCV RBC: 85 FL (ref 80–100)
MONOCYTES NFR BLD: 12.6 % (ref 2–11)
NEUTROPHILS NFR BLD AUTO: 54.3 % (ref 40–80)
NITRITE UR-MCNC: NEGATIVE MG/ML
OSMOLALITY SERPL CALC.SUM OF ELEC: 266 MOSM/KG (ref 275–300)
PH UR STRIP: 6 [PH] (ref 5–6)
PLATELET # BLD: 365 10X3/UL (ref 130–400)
PMV BLD AUTO: 10.4 FL (ref 7.4–10.4)
POTASSIUM SERPL-SCNC: 2.7 MMOL/L (ref 3.5–5.1)
PROT SERPL-MCNC: 8.6 G/DL (ref 6.4–8.2)
PROT UR-MCNC: (no result) MG/DL
RBC # BLD AUTO: 5.19 10X6/UL (ref 4–5.4)
RBC #/AREA URNS HPF: (no result) /HPF (ref 0–5)
SODIUM SERPL-SCNC: 132 MMOL/L (ref 136–145)
SP GR UR STRIP: 1.02 (ref 1–1.02)
SQUAMOUS #/AREA URNS HPF: (no result) /HPF (ref 0–5)
UROBILINOGEN UR-MCNC: NORMAL MG/DL
WBC # BLD AUTO: 9.6 10X3/UL (ref 4.8–10.8)
WBC #/AREA URNS HPF: (no result) /HPF

## 2020-02-19 ENCOUNTER — HOSPITAL ENCOUNTER (OUTPATIENT)
Dept: HOSPITAL 84 - D.LDO | Age: 25
LOS: 1 days | Discharge: HOME | End: 2020-02-20
Attending: OBSTETRICS & GYNECOLOGY
Payer: COMMERCIAL

## 2020-02-19 DIAGNOSIS — O26.899: Primary | ICD-10-CM

## 2020-02-19 DIAGNOSIS — R10.9: ICD-10-CM

## 2020-02-19 DIAGNOSIS — Z3A.00: ICD-10-CM

## 2020-02-19 LAB
APPEARANCE UR: CLEAR
BACTERIA #/AREA URNS HPF: (no result) /HPF
BILIRUB SERPL-MCNC: NEGATIVE MG/DL
COLOR UR: YELLOW
GLUCOSE SERPL-MCNC: NEGATIVE MG/DL
KETONES UR STRIP-MCNC: (no result) MG/DL
MUCOUS THREADS #/AREA URNS LPF: (no result) /LPF
NITRITE UR-MCNC: NEGATIVE MG/ML
PH UR STRIP: 6 [PH] (ref 5–6)
PROT UR-MCNC: (no result) MG/DL
RBC #/AREA URNS HPF: (no result) /HPF (ref 0–5)
SP GR UR STRIP: 1.02 (ref 1–1.02)
SQUAMOUS #/AREA URNS HPF: (no result) /HPF (ref 0–5)
UROBILINOGEN UR-MCNC: NORMAL MG/DL
WBC #/AREA URNS HPF: (no result) /HPF

## 2020-04-07 ENCOUNTER — HOSPITAL ENCOUNTER (OUTPATIENT)
Dept: HOSPITAL 84 - D.LDO | Age: 25
Discharge: HOME | End: 2020-04-07
Attending: OBSTETRICS & GYNECOLOGY
Payer: COMMERCIAL

## 2020-04-07 LAB
BACTERIA #/AREA URNS HPF: (no result) /HPF
BILIRUB SERPL-MCNC: NEGATIVE MG/DL
GLUCOSE SERPL-MCNC: NEGATIVE MG/DL
KETONES UR STRIP-MCNC: (no result) MG/DL
NITRITE UR-MCNC: NEGATIVE MG/ML
PH UR STRIP: 5 [PH] (ref 5–6)
RBC #/AREA URNS HPF: (no result) /HPF (ref 0–5)
SP GR UR STRIP: 1.02 (ref 1–1.02)
SQUAMOUS #/AREA URNS HPF: (no result) /HPF (ref 0–5)
T VAGINALIS URNS QL MICRO: PRESENT /HPF
UROBILINOGEN UR-MCNC: NORMAL MG/DL
WBC #/AREA URNS HPF: (no result) /HPF

## 2020-04-08 ENCOUNTER — HOSPITAL ENCOUNTER (OUTPATIENT)
Dept: HOSPITAL 84 - D.LDO | Age: 25
Discharge: HOME | End: 2020-04-08
Attending: OBSTETRICS & GYNECOLOGY
Payer: COMMERCIAL

## 2020-04-08 VITALS — BODY MASS INDEX: 23.04 KG/M2 | HEIGHT: 63 IN | WEIGHT: 130 LBS

## 2020-04-08 VITALS — SYSTOLIC BLOOD PRESSURE: 101 MMHG | DIASTOLIC BLOOD PRESSURE: 58 MMHG

## 2020-04-08 DIAGNOSIS — O26.899: Primary | ICD-10-CM

## 2020-04-08 DIAGNOSIS — Z3A.00: ICD-10-CM

## 2020-04-08 LAB
ALBUMIN SERPL-MCNC: 3.6 G/DL (ref 3.4–5)
ALP SERPL-CCNC: 134 U/L (ref 30–120)
ALT SERPL-CCNC: 37 U/L (ref 10–68)
AMYLASE SERPL-CCNC: 157 U/L (ref 25–115)
ANION GAP SERPL CALC-SCNC: 24.6 MMOL/L (ref 8–16)
BASOPHILS NFR BLD AUTO: 0 % (ref 0–2)
BILIRUB SERPL-MCNC: 0.6 MG/DL (ref 0.2–1.3)
BUN SERPL-MCNC: 14 MG/DL (ref 7–18)
CALCIUM SERPL-MCNC: 9.3 MG/DL (ref 8.5–10.1)
CHLORIDE SERPL-SCNC: 98 MMOL/L (ref 98–107)
CO2 SERPL-SCNC: 16.5 MMOL/L (ref 21–32)
CREAT SERPL-MCNC: 0.8 MG/DL (ref 0.6–1.3)
EOSINOPHIL NFR BLD: 0 % (ref 0–7)
ERYTHROCYTE [DISTWIDTH] IN BLOOD BY AUTOMATED COUNT: 13.2 % (ref 11.5–14.5)
GLOBULIN SER-MCNC: 4.4 G/L
GLUCOSE SERPL-MCNC: 99 MG/DL (ref 74–106)
HCT VFR BLD CALC: 38.2 % (ref 36–48)
HGB BLD-MCNC: 12.3 G/DL (ref 12–16)
IMM GRANULOCYTES NFR BLD: 0.4 % (ref 0–5)
LIPASE SERPL-CCNC: 314 U/L (ref 73–393)
LYMPHOCYTES NFR BLD AUTO: 14.9 % (ref 15–50)
MCH RBC QN AUTO: 28.9 PG (ref 26–34)
MCHC RBC AUTO-ENTMCNC: 32.2 G/DL (ref 31–37)
MCV RBC: 89.9 FL (ref 80–100)
MONOCYTES NFR BLD: 4.7 % (ref 2–11)
NEUTROPHILS NFR BLD AUTO: 80 % (ref 40–80)
OSMOLALITY SERPL CALC.SUM OF ELEC: 270 MOSM/KG (ref 275–300)
PLATELET # BLD: 258 10X3/UL (ref 130–400)
PMV BLD AUTO: 11.6 FL (ref 7.4–10.4)
POTASSIUM SERPL-SCNC: 4.1 MMOL/L (ref 3.5–5.1)
PROT SERPL-MCNC: 8 G/DL (ref 6.4–8.2)
RBC # BLD AUTO: 4.25 10X6/UL (ref 4–5.4)
SODIUM SERPL-SCNC: 135 MMOL/L (ref 136–145)
WBC # BLD AUTO: 5.5 10X3/UL (ref 4.8–10.8)

## 2020-05-01 ENCOUNTER — HOSPITAL ENCOUNTER (INPATIENT)
Dept: HOSPITAL 84 - D.LD | Age: 25
LOS: 2 days | Discharge: HOME | End: 2020-05-03
Attending: OBSTETRICS & GYNECOLOGY | Admitting: OBSTETRICS & GYNECOLOGY
Payer: COMMERCIAL

## 2020-05-01 VITALS — SYSTOLIC BLOOD PRESSURE: 120 MMHG | DIASTOLIC BLOOD PRESSURE: 70 MMHG

## 2020-05-01 VITALS — DIASTOLIC BLOOD PRESSURE: 76 MMHG | SYSTOLIC BLOOD PRESSURE: 111 MMHG

## 2020-05-01 VITALS — WEIGHT: 130 LBS | HEIGHT: 64 IN | BODY MASS INDEX: 22.2 KG/M2 | BODY MASS INDEX: 22.2 KG/M2

## 2020-05-01 VITALS — DIASTOLIC BLOOD PRESSURE: 62 MMHG | SYSTOLIC BLOOD PRESSURE: 100 MMHG

## 2020-05-01 DIAGNOSIS — Z3A.38: ICD-10-CM

## 2020-05-01 DIAGNOSIS — A59.9: ICD-10-CM

## 2020-05-01 DIAGNOSIS — F12.90: ICD-10-CM

## 2020-05-01 DIAGNOSIS — O36.5930: Primary | ICD-10-CM

## 2020-05-01 LAB
AMPHETAMINES UR QL SCN: NEGATIVE QUAL
BACTERIA #/AREA URNS HPF: (no result) /HPF
BARBITURATES UR QL SCN: NEGATIVE QUAL
BENZODIAZ UR QL SCN: NEGATIVE QUAL
BILIRUB SERPL-MCNC: NEGATIVE MG/DL
BZE UR QL SCN: NEGATIVE QUAL
CANNABINOIDS UR QL SCN: NEGATIVE QUAL
ERYTHROCYTE [DISTWIDTH] IN BLOOD BY AUTOMATED COUNT: 13.2 % (ref 11.5–14.5)
GLUCOSE SERPL-MCNC: NEGATIVE MG/DL
HCT VFR BLD CALC: 32 % (ref 36–48)
HGB BLD-MCNC: 10 G/DL (ref 12–16)
KETONES UR STRIP-MCNC: NEGATIVE MG/DL
MCH RBC QN AUTO: 27.9 PG (ref 26–34)
MCHC RBC AUTO-ENTMCNC: 31.3 G/DL (ref 31–37)
MCV RBC: 89.4 FL (ref 80–100)
NITRITE UR-MCNC: NEGATIVE MG/ML
OPIATES UR QL SCN: NEGATIVE QUAL
PCP UR QL SCN: NEGATIVE QUAL
PH UR STRIP: 6 [PH] (ref 5–6)
PLATELET # BLD: 213 10X3/UL (ref 130–400)
PMV BLD AUTO: 11.6 FL (ref 7.4–10.4)
RBC # BLD AUTO: 3.58 10X6/UL (ref 4–5.4)
RBC #/AREA URNS HPF: (no result) /HPF (ref 0–5)
SP GR UR STRIP: 1.01 (ref 1–1.02)
SQUAMOUS #/AREA URNS HPF: (no result) /HPF (ref 0–5)
UROBILINOGEN UR-MCNC: 4 MG/DL
WBC # BLD AUTO: 5 10X3/UL (ref 4.8–10.8)
WBC #/AREA URNS HPF: (no result) /HPF
YEAST #/AREA URNS HPF: (no result) /HPF

## 2020-05-01 NOTE — NUR
RN TO PT BEDSIDE, VSS. PT DENIES ANY PAIN AT THIS TIME. PT EATING IN BED.
STATES ALL NEEDS ARE MET AT THIS TIME. BED IN LOWEST POSITION, SIDE RAILS UP
X2, CALL LIGHT WITHIN REACH.

## 2020-05-01 NOTE — NUR
RN TO PT BEDSIDE FOR ROUNDING, PT STATES PAIN IS 1/10, FUNDUS IS FIRM,
MIDLINE, 2 BELOW, SCANT RUBRA LOCHIA NOTED TO PERIPAD. PT STATES ALL NEEDS
CURRENTLY MET. BED IN LOWEST POSITION,  CALL LIGHT IN REACH, SIDE RAILS UPX2.

## 2020-05-01 NOTE — NUR
PT ASSISTED TO STANDING POSITION, PT STEADY ON FEET, PT AMBULATED TO BATHROOM
WITH STANDBY ASSISTANCE. U.O. OF 475ML TO URINE HAT AT THIS TIME.

## 2020-05-02 VITALS — DIASTOLIC BLOOD PRESSURE: 80 MMHG | SYSTOLIC BLOOD PRESSURE: 115 MMHG

## 2020-05-02 VITALS — SYSTOLIC BLOOD PRESSURE: 109 MMHG | DIASTOLIC BLOOD PRESSURE: 61 MMHG

## 2020-05-02 VITALS — DIASTOLIC BLOOD PRESSURE: 70 MMHG | SYSTOLIC BLOOD PRESSURE: 112 MMHG

## 2020-05-02 LAB
BASOPHILS NFR BLD AUTO: 0.1 % (ref 0–2)
EOSINOPHIL NFR BLD: 0.4 % (ref 0–7)
ERYTHROCYTE [DISTWIDTH] IN BLOOD BY AUTOMATED COUNT: 13.2 % (ref 11.5–14.5)
HCT VFR BLD CALC: 32.2 % (ref 36–48)
HGB BLD-MCNC: 10.1 G/DL (ref 12–16)
IMM GRANULOCYTES NFR BLD: 0.4 % (ref 0–5)
LYMPHOCYTES NFR BLD AUTO: 24.3 % (ref 15–50)
MCH RBC QN AUTO: 28.1 PG (ref 26–34)
MCHC RBC AUTO-ENTMCNC: 31.4 G/DL (ref 31–37)
MCV RBC: 89.7 FL (ref 80–100)
MONOCYTES NFR BLD: 9.6 % (ref 2–11)
NEUTROPHILS NFR BLD AUTO: 65.2 % (ref 40–80)
PLATELET # BLD: 184 10X3/UL (ref 130–400)
PMV BLD AUTO: 11.8 FL (ref 7.4–10.4)
RBC # BLD AUTO: 3.59 10X6/UL (ref 4–5.4)
WBC # BLD AUTO: 7.3 10X3/UL (ref 4.8–10.8)

## 2020-05-02 NOTE — NUR
SITTING UP IN BED CHANGING INFANT DIAPER.  SAYS HER CRAMPING IS BETTER.  NOW
3/10.  DENIES NEEDING ANYTHING.  TO CALL WHEN READY TO SHOWER OR IF NEEDING
ANYTHING.  FRESH WATER IN ROOM. CALL LIGHT IN REACH.

## 2020-05-02 NOTE — NUR
INFANT IN NURSERY.  SLEEPING ON RIGHT SIDE, RESPIRATIONS EVEN. WILL COMPLETE
SHIFT ASSESSMENT WHEN AWAKE.

## 2020-05-02 NOTE — NUR
PT READY FOR SHOWER, INFANT TO NSY VIA OPEN CRIB CART PER THIS RN, TOWELS AND
WASH CLOTHS PROVIDED, PT REPORTS THAT SHE HAS OWN TOILETRIES AND GOWN, PT TO
SHOWER, COMPLETE BEDDING CHANGE DONE

## 2020-05-02 NOTE — NUR
SHIFT ASSESSMENT COMPLETED.  MOTRIN 600 MG GIVEN PO FOR RELIEF OF 5/10
ABDOMINAL CRAMPING AFTER DISCUSSING PAIN MANAGEMENT OPTION.  INFANT BACK IN
ROOM. GETTING READY TO BOTTLE FEED.  , NON SMOKER, SAYS SHE HAD FLU AND
TDAP VACCINATION DURING PREGNANCY.

## 2020-05-02 NOTE — NUR
PT LOOKING AT CELL PHONE, INFANT IN OPEN CRIB CART AT BEDSIDE, PT REQUESTED
AND SERVED PORSHA MORENO, PT DENIES FURTHER NEEDS

## 2020-05-02 NOTE — NUR
LAYING TOWARD RIGHT SIDE WATCHING TV.  ASKED FOR LIGHTS TO BE TURNED OFF.
DENIES NEEDING ANYTHING.  INFANT IN CRIB-RESPIRATIONS EVEN. PLACE CRIB ON SIDE
OF BED SO PT CAN VIEW INFANT.  SIDERAILS UP X 2, CALL LIGHT IN REACH.

## 2020-05-02 NOTE — NUR
SITTING UP IN BED WATCHING TV AND LOOKING AT PHONE.  VS OBTAINED  U/2 FIRM
MIDLINE.  3-4/10 CRAMPING STATES "NOT AS BAD AS IT WAS BEFORE". OFFERED PAIN
MEDICATION.  MOTRIN 600 MG GIVEN PO FOR RELIEF.  NO ADDITIONAL REQUESTS AT
THIS TIME.  INFANT SLEEPING IN CRIB.  TO CALL IF ANYTHING ELSE IS NEEDED.
VERBALIZED UNDERSTANDING.

## 2020-05-02 NOTE — NUR
ASSESSMENT PER FLOW SHEET, VS OBTAINED, SALINE LOCK IN LEFT FA INTACT WITH NO
REDNESS OR EDEMA, FF, ML, U/2, PT REPORTS LITE BLEEDING WITH NO CLOTS, PT
DENIES FLATUS, SMALL BM TODAY, AND VOIDING WITH NO DIFFICULTY, PT DENIES PAIN,
REQUESTED AND SERVED CUP OF ICE, PT DENIES FURTHER NEEDS, INFANT IN ARMS

## 2020-05-02 NOTE — NUR
ASSUMED CARE OF THIS PATIENT. LAYING IN BED HOLDING INFANT IN ARMS.  DENIES
NEEDING ANYTHING AT THIS TIME. SIDERAILS UP X2, CALL LIGHT IN REACH. TO CALL
IF ANYTHING IS NEEDED.  0+ RUBELL IMMUNE. BOTTLEFEEDING. WILL CHECK TDAP
STATUS.

## 2020-05-02 NOTE — NUR
SITTING UP IN BED TEACHING FOB HOW TO SWADDLE INFANT.  DENIES NEEDING ANYTHING
AT PRESENT.  TO CALL IF ANYTHING IS NEEDED.

## 2020-05-02 NOTE — NUR
PT BOTTLE FEEDING INFANT, DENIES NEEDS OR PAIN AT THIS TIME, BED IN LOW
POSITION, SIDE RAILS X 2, CALL LIGHT IN REACH

## 2020-05-02 NOTE — NUR
RECEIVED SHIFT REPORT FROM KURT DIXON RN, PT BOTTLE FEEDING INFANT, INFORMED
PT THAT I WILL COME BACK SHORTLY TO DO ASSESSMENT, PT VERBALIZES
UNDERSTANDING, DENIES NEEDS OR PAIN AT THIS TIME

## 2020-05-02 NOTE — NUR
SITTING UP IN BED HOLDING INFANT.  SAYS SHE IS NO LONGER HAVING ANY CRAMPING.
PLANS SHOWER THIS PM.  DINNER TRAY AT BEDSIDE.  NO REQUESTS.  TO CALL IF
ANYTHING IS NEEDED.  ANTICIPATE DC HOME TOMORROW.  HX +GBS.

## 2020-05-03 VITALS — DIASTOLIC BLOOD PRESSURE: 66 MMHG | SYSTOLIC BLOOD PRESSURE: 116 MMHG

## 2020-05-03 NOTE — NUR
ASSUMED CARE OF THIS PATIENT.  RETURNING FROM BATHROOM.  DENIES NEEDING
ANYTHING.  INFANT IN CRIB.  REMINDED PT NOT TO KEEP EYE ON INFANT EVEN WHEN IN
BATHROOM AND NOT TO LEAVE INFANT ALONE WITH BR DOOR CLOSED.  EXPLAINED REASON
FOR THIS.  WILL COMPLETE SHIFT ASSESSMENT WHEN BACK IN BED.

## 2020-05-03 NOTE — NUR
PT RESTING WITH EYES CLOSED, RESP QUIET, NO DISTRESS NOTED, LEFT UNDISTURBED
AT THIS TIME, INFANT IN OPEN CRIB CART AT BEDSIDE

## 2020-05-03 NOTE — NUR
FOUND PRESCRIPTION FOR NORCO AND MOTRIN ON PT RECORD.  ATTEMPTED TO CONTACT
PATIENT ON HER PHONE. LOCATED SIGNIFICANT OTHER'S NUMBER WHO WILL CONTACT HER
AND HAVE HER CALL.

## 2020-05-03 NOTE — NUR
DC'D VIA WHEELCHAIR TO CAR.  INFANT IN CARSEAT.  ALL BELONGINGS REMOVED FROM
ROOM. HAS DC INSTRUCTIONS.  FAMILY MEMBER DRIVING.

## 2020-05-03 NOTE — NUR
DC TEACHING COMPLETED TO INCLUDE ROUTINE PP VAG DEL CARE, DANGER SIGNS, S&S
INFECTION, PP DEPRESSION, BOTTLEFEEDING, BREASTCARE, IMMUNIZATIONS, MEDICATION
ADMINISTRATION AND FOLLOW-UP.  BOTH VERBAL AND WRITTEN INFORMATION GIVEN.  NO
SPECIFIC QUESTIONS ASKED.  TO CALL MONDAY TO SCHEDULE 4 WEEK POSTPARTUM VISIT.

## 2020-05-03 NOTE — NUR
SITTING UP IN BED. INFANT IN CRIB.  DENIES NEEDING ANYTHING AT THIS TIME.
SAYS SHE WAS TOLD SHE CAN GO HOME WHEN BABY IS DISCHARGED.  WILL CALL MD FOR
ORDERS WHEN INFANT DC'D.  CALL LIGHT IN REACH.

## 2020-09-09 ENCOUNTER — HOSPITAL ENCOUNTER (EMERGENCY)
Dept: HOSPITAL 84 - D.ER | Age: 25
Discharge: HOME | End: 2020-09-09
Payer: COMMERCIAL

## 2020-09-09 VITALS — HEIGHT: 64 IN | BODY MASS INDEX: 22.93 KG/M2 | WEIGHT: 134.28 LBS

## 2020-09-09 VITALS — DIASTOLIC BLOOD PRESSURE: 61 MMHG | SYSTOLIC BLOOD PRESSURE: 111 MMHG

## 2020-09-09 DIAGNOSIS — R51: ICD-10-CM

## 2020-09-09 DIAGNOSIS — Z3A.00: ICD-10-CM

## 2020-09-09 DIAGNOSIS — O26.899: Primary | ICD-10-CM

## 2020-09-09 DIAGNOSIS — R11.2: ICD-10-CM

## 2020-09-09 LAB
ALBUMIN SERPL-MCNC: 4.3 G/DL (ref 3.4–5)
ALP SERPL-CCNC: 60 U/L (ref 30–120)
ALT SERPL-CCNC: 41 U/L (ref 10–68)
AMPHETAMINES UR QL SCN: NEGATIVE QUAL
ANION GAP SERPL CALC-SCNC: 19.6 MMOL/L (ref 8–16)
BACTERIA #/AREA URNS HPF: (no result) /HPF
BARBITURATES UR QL SCN: NEGATIVE QUAL
BASOPHILS NFR BLD AUTO: 0 % (ref 0–2)
BENZODIAZ UR QL SCN: NEGATIVE QUAL
BILIRUB SERPL-MCNC: 0.52 MG/DL (ref 0.2–1.3)
BILIRUB SERPL-MCNC: NEGATIVE MG/DL
BUN SERPL-MCNC: 15 MG/DL (ref 7–18)
BZE UR QL SCN: NEGATIVE QUAL
CALCIUM SERPL-MCNC: 9.3 MG/DL (ref 8.5–10.1)
CANNABINOIDS UR QL SCN: POSITIVE QUAL
CHLORIDE SERPL-SCNC: 102 MMOL/L (ref 98–107)
CO2 SERPL-SCNC: 21 MMOL/L (ref 21–32)
CREAT SERPL-MCNC: 1 MG/DL (ref 0.6–1.3)
EOSINOPHIL NFR BLD: 0 % (ref 0–7)
ERYTHROCYTE [DISTWIDTH] IN BLOOD BY AUTOMATED COUNT: 13.3 % (ref 11.5–14.5)
GLOBULIN SER-MCNC: 4.1 G/L
GLUCOSE SERPL-MCNC: 152 MG/DL (ref 74–106)
HCG UR QL: POSITIVE
HCT VFR BLD CALC: 39.5 % (ref 36–48)
HGB BLD-MCNC: 13 G/DL (ref 12–16)
IMM GRANULOCYTES NFR BLD: 0.3 % (ref 0–5)
KETONES UR STRIP-MCNC: (no result) MG/DL
LIPASE SERPL-CCNC: 66 U/L (ref 73–393)
LYMPHOCYTES NFR BLD AUTO: 7.6 % (ref 15–50)
MAGNESIUM SERPL-MCNC: 1.9 MG/DL (ref 1.8–2.4)
MCH RBC QN AUTO: 29 PG (ref 26–34)
MCHC RBC AUTO-ENTMCNC: 32.9 G/DL (ref 31–37)
MCV RBC: 88.2 FL (ref 80–100)
MONOCYTES NFR BLD: 2 % (ref 2–11)
NEUTROPHILS NFR BLD AUTO: 90.1 % (ref 40–80)
NITRITE UR-MCNC: NEGATIVE MG/ML
OPIATES UR QL SCN: NEGATIVE QUAL
OSMOLALITY SERPL CALC.SUM OF ELEC: 281 MOSM/KG (ref 275–300)
PCP UR QL SCN: NEGATIVE QUAL
PH UR STRIP: 6 [PH] (ref 5–6)
PLATELET # BLD: 330 10X3/UL (ref 130–400)
PMV BLD AUTO: 10.1 FL (ref 7.4–10.4)
POTASSIUM SERPL-SCNC: 3.6 MMOL/L (ref 3.5–5.1)
PROT SERPL-MCNC: 8.4 G/DL (ref 6.4–8.2)
RBC # BLD AUTO: 4.48 10X6/UL (ref 4–5.4)
SODIUM SERPL-SCNC: 139 MMOL/L (ref 136–145)
SQUAMOUS #/AREA URNS HPF: (no result) /HPF (ref 0–5)
UROBILINOGEN UR-MCNC: NORMAL MG/DL
WBC # BLD AUTO: 7.5 10X3/UL (ref 4.8–10.8)
WBC #/AREA URNS HPF: (no result) /HPF (ref 0–5)

## 2020-09-15 ENCOUNTER — HOSPITAL ENCOUNTER (OUTPATIENT)
Dept: HOSPITAL 84 - D.ER | Age: 25
Setting detail: OBSERVATION
LOS: 1 days | Discharge: HOME | End: 2020-09-16
Attending: OBSTETRICS & GYNECOLOGY | Admitting: OBSTETRICS & GYNECOLOGY
Payer: COMMERCIAL

## 2020-09-15 VITALS
HEIGHT: 64 IN | WEIGHT: 130 LBS | BODY MASS INDEX: 22.2 KG/M2 | BODY MASS INDEX: 22.2 KG/M2 | WEIGHT: 130 LBS | HEIGHT: 64 IN | BODY MASS INDEX: 22.2 KG/M2

## 2020-09-15 VITALS — SYSTOLIC BLOOD PRESSURE: 122 MMHG | DIASTOLIC BLOOD PRESSURE: 79 MMHG

## 2020-09-15 VITALS — DIASTOLIC BLOOD PRESSURE: 79 MMHG | SYSTOLIC BLOOD PRESSURE: 123 MMHG

## 2020-09-15 VITALS — SYSTOLIC BLOOD PRESSURE: 126 MMHG | DIASTOLIC BLOOD PRESSURE: 72 MMHG

## 2020-09-15 VITALS — SYSTOLIC BLOOD PRESSURE: 123 MMHG | DIASTOLIC BLOOD PRESSURE: 80 MMHG

## 2020-09-15 DIAGNOSIS — E87.1: ICD-10-CM

## 2020-09-15 DIAGNOSIS — O26.891: Primary | ICD-10-CM

## 2020-09-15 DIAGNOSIS — E87.6: ICD-10-CM

## 2020-09-15 DIAGNOSIS — Z3A.01: ICD-10-CM

## 2020-09-15 DIAGNOSIS — R11.2: ICD-10-CM

## 2020-09-15 DIAGNOSIS — N39.0: ICD-10-CM

## 2020-09-15 LAB
ALBUMIN SERPL-MCNC: 4.6 G/DL (ref 3.4–5)
ALP SERPL-CCNC: 79 U/L (ref 30–120)
ALT SERPL-CCNC: 39 U/L (ref 10–68)
ANION GAP SERPL CALC-SCNC: 15.5 MMOL/L (ref 8–16)
BACTERIA #/AREA URNS HPF: (no result) /HPF
BASOPHILS NFR BLD AUTO: 0.3 % (ref 0–2)
BILIRUB SERPL-MCNC: 1.25 MG/DL (ref 0.2–1.3)
BILIRUB SERPL-MCNC: NEGATIVE MG/DL
BUN SERPL-MCNC: 26 MG/DL (ref 7–18)
CALCIUM SERPL-MCNC: 10 MG/DL (ref 8.5–10.1)
CHLORIDE SERPL-SCNC: 91 MMOL/L (ref 98–107)
CO2 SERPL-SCNC: 25.8 MMOL/L (ref 21–32)
CREAT SERPL-MCNC: 1.3 MG/DL (ref 0.6–1.3)
EOSINOPHIL NFR BLD: 0.1 % (ref 0–7)
ERYTHROCYTE [DISTWIDTH] IN BLOOD BY AUTOMATED COUNT: 12.8 % (ref 11.5–14.5)
GLOBULIN SER-MCNC: 4.6 G/L
GLUCOSE SERPL-MCNC: 96 MG/DL (ref 74–106)
HCT VFR BLD CALC: 47.6 % (ref 36–48)
HGB BLD-MCNC: 17 G/DL (ref 12–16)
IMM GRANULOCYTES NFR BLD: 0.3 % (ref 0–5)
KETONES UR STRIP-MCNC: (no result) MG/DL
LYMPHOCYTES NFR BLD AUTO: 35.1 % (ref 15–50)
MCH RBC QN AUTO: 30 PG (ref 26–34)
MCHC RBC AUTO-ENTMCNC: 35.7 G/DL (ref 31–37)
MCV RBC: 84 FL (ref 80–100)
MONOCYTES NFR BLD: 14 % (ref 2–11)
NEUTROPHILS NFR BLD AUTO: 50.2 % (ref 40–80)
NITRITE UR-MCNC: NEGATIVE MG/ML
OSMOLALITY SERPL CALC.SUM OF ELEC: 263 MOSM/KG (ref 275–300)
PH UR STRIP: 5 [PH] (ref 5–8)
PLATELET # BLD: 465 10X3/UL (ref 130–400)
PMV BLD AUTO: 10.4 FL (ref 7.4–10.4)
POTASSIUM SERPL-SCNC: 3.3 MMOL/L (ref 3.5–5.1)
PROT SERPL-MCNC: 9.2 G/DL (ref 6.4–8.2)
RBC # BLD AUTO: 5.67 10X6/UL (ref 4–5.4)
SODIUM SERPL-SCNC: 129 MMOL/L (ref 136–145)
SQUAMOUS #/AREA URNS HPF: (no result) /HPF (ref 0–5)
UROBILINOGEN UR-MCNC: NORMAL MG/DL (ref ?–2)
WBC # BLD AUTO: 8.9 10X3/UL (ref 4.8–10.8)

## 2020-09-15 NOTE — NUR
PT REC'D FROM ER VIA WHEELCHAIR WITH A DX OF PYELONEPHRITIS AND HYPEREMESIS
GRAVIDARUM. IV TO THE RT AC. SITE CLEAR AND PATENT. HYPOACTIVE BS TO ALL
QUADRANTS. VSS. DENIES NAUSEA ON ARRIVAL. NO DISTRESS NOTED. ROSCOE MUSTAFA RN

## 2020-09-16 VITALS — DIASTOLIC BLOOD PRESSURE: 71 MMHG | SYSTOLIC BLOOD PRESSURE: 133 MMHG

## 2020-09-16 VITALS — SYSTOLIC BLOOD PRESSURE: 113 MMHG | DIASTOLIC BLOOD PRESSURE: 84 MMHG

## 2020-09-16 VITALS — DIASTOLIC BLOOD PRESSURE: 70 MMHG | SYSTOLIC BLOOD PRESSURE: 95 MMHG

## 2020-09-16 VITALS — SYSTOLIC BLOOD PRESSURE: 129 MMHG | DIASTOLIC BLOOD PRESSURE: 83 MMHG

## 2020-09-16 LAB
ANION GAP SERPL CALC-SCNC: 10.7 MMOL/L (ref 8–16)
BASOPHILS NFR BLD AUTO: 0.2 % (ref 0–2)
BUN SERPL-MCNC: 21 MG/DL (ref 7–18)
CALCIUM SERPL-MCNC: 8.9 MG/DL (ref 8.5–10.1)
CHLORIDE SERPL-SCNC: 100 MMOL/L (ref 98–107)
CO2 SERPL-SCNC: 28.7 MMOL/L (ref 21–32)
CREAT SERPL-MCNC: 1.2 MG/DL (ref 0.6–1.3)
EOSINOPHIL NFR BLD: 0.5 % (ref 0–7)
ERYTHROCYTE [DISTWIDTH] IN BLOOD BY AUTOMATED COUNT: 13 % (ref 11.5–14.5)
GLUCOSE SERPL-MCNC: 82 MG/DL (ref 74–106)
HCT VFR BLD CALC: 43.3 % (ref 36–48)
HGB BLD-MCNC: 14.7 G/DL (ref 12–16)
IMM GRANULOCYTES NFR BLD: 0.2 % (ref 0–5)
LYMPHOCYTES NFR BLD AUTO: 41 % (ref 15–50)
MCH RBC QN AUTO: 29.3 PG (ref 26–34)
MCHC RBC AUTO-ENTMCNC: 33.9 G/DL (ref 31–37)
MCV RBC: 86.4 FL (ref 80–100)
MONOCYTES NFR BLD: 11.8 % (ref 2–11)
NEUTROPHILS NFR BLD AUTO: 46.3 % (ref 40–80)
OSMOLALITY SERPL CALC.SUM OF ELEC: 273 MOSM/KG (ref 275–300)
PLATELET # BLD: 394 10X3/UL (ref 130–400)
PMV BLD AUTO: 10.5 FL (ref 7.4–10.4)
POTASSIUM SERPL-SCNC: 3.4 MMOL/L (ref 3.5–5.1)
RBC # BLD AUTO: 5.01 10X6/UL (ref 4–5.4)
SODIUM SERPL-SCNC: 136 MMOL/L (ref 136–145)
WBC # BLD AUTO: 8.7 10X3/UL (ref 4.8–10.8)

## 2020-09-16 NOTE — NUR
RN TO PT BS FOR ROUNDS. PT RESTING IN BED, ON STOMACH, IN NO ACUTE DISTRESS.
PT DENIES ANY NEEDS AT THIS TIME.  WILL CONT TO MONITOR PT STATUS.

## 2020-09-16 NOTE — NUR
PT RESTING IN BED IN RIGHT LATERAL POSITION IN NO ACUTE DISTRESS. NEW BAG OF
NS WITH 20MEQ KCL HUNG TO INFUSE VIA PUMP  CC/HR. PT DENIES ANY NEEDS AT
THIS TIME. WILL CONT TO MONITOR PT STATUS.

## 2020-09-16 NOTE — NUR
PT REC'D IN BED AT THIS TIME. RESTING WELL AT THIS TIME. NO VOMITING NOTED
THIS SHIFT. NAUSEA RESOLVED WITH REGLAN. IV SITE REMAINS PATENT. NO DISTRESS
NOTED. ROSCOE MUSTAFA RN

## 2020-09-16 NOTE — NUR
RN TO PT BS. PT RESTING IN BED IN NO ACUTE DISTRESS. PT DENIES ANY NEEDS AT
THIS TIME. WILL CONT TO MONITOR PT STATUS.

## 2020-09-16 NOTE — NUR
DR. VAN AT PT BS TO DISCUSS POC. WILL PLAN TO D/C PT HOME. 20G IV TO RIGHT AC
D/C'D AT THIS TIME, TIP INTACT, PRESSURE DRESSING PLACED TO SITE. WILL
PREPARE D/C PAPERWORK.

## 2020-09-16 NOTE — NUR
10 MEQ KCL RIDER HUNG TO INFUSE VIA PUMP  CC/HR. PT DENIES ANY NEEDS AT
THIS TIME, PT STATES SHE TOLERATED PORSHA CRACKERS WITHOUT DIFFICULTY. WILL
CONT TO MONITOR PT STATUS.

## 2020-09-16 NOTE — NUR
REVIEWED DISCHARGE INSTRUCTIONS WITH PATIENT. STATES UNDERSTANDING. INSTRUCTED
PT TO SCHEDULE NEW PATIENT APPOINTMENT TO BEGIN OBSTETRIC CARE. NO
PRESCRIPTIONS TO GIVE PATIENT. PRESCRIPTIONS CALLED IN BY PHYSICAN. PT.
DISCHARGED HOME VIA WHEELCHAIR TO PRIVATE VEHICLE.

## 2020-09-16 NOTE — NUR
RN TO PT BS. PT SITTING IN BED, TEXTING ON PHONE, PT IN NO ACUTE DISTRESS.
PORSHA MORENO AND MAGEN PROVIDED TO PT AT THIS TIME. PT DENIES ANY FURTHER
NEEDS.

## 2020-09-16 NOTE — NUR
RN TO PT BS FOR ROUNDS. PT RESTING IN BED, ON STOMACH, IN NO ACUTE DITRESS.
BED IN LOW POSITION, SIDE RAILS UP TIMES 2, CALL LIGHT AND PHONE IN REACH.
WILL CONT TO MONITOR PT STATUS.

## 2021-01-06 NOTE — NUR
YESIKA PAGE. DR. VAN AT PT BS FOR ASSESSMENT. WILL CONT CLEAR LIQUID DIET AND
ATTEMPT TO TRANSITION TO BLAND DIET FOR LUNCH. PT DENIES ANY NEEDS AT THIS
TIME.  WILL CONT TO MONITOR PT STATUS. There are no Wet Read(s) to document.

## 2022-02-09 NOTE — NUR
INFANT BACK TO NBN PER PT REQUEST FOR SHIFT ASSESSMENT AND HEARING SCREEN. PT
REQUEST INFANT BE BROUGHT BACK TO ROOM WHEN THESE ITEMS ARE COMPLETED. EVERETTE MILLER LPN NOTIFIED. My Safety/Recovery Plan    1) Warning Signs:  racing thoughts     2) Personal Coping Strategies to Calm Myself: nothing works    3) reasons for living/recovery- parents     4) Activities/Social Interactions for Distraction: watch tv    5) Who to Contact While on the Unit: mother    6) Making My Surroundings Safe:  nothing